# Patient Record
Sex: FEMALE | Race: WHITE | NOT HISPANIC OR LATINO | URBAN - METROPOLITAN AREA
[De-identification: names, ages, dates, MRNs, and addresses within clinical notes are randomized per-mention and may not be internally consistent; named-entity substitution may affect disease eponyms.]

---

## 2017-01-14 ENCOUNTER — OFFICE VISIT - HEALTHEAST (OUTPATIENT)
Dept: FAMILY MEDICINE | Facility: CLINIC | Age: 37
End: 2017-01-14

## 2017-01-14 DIAGNOSIS — N89.8 VAGINAL ITCHING: ICD-10-CM

## 2017-01-14 DIAGNOSIS — N76.0 VULVOVAGINITIS: ICD-10-CM

## 2017-02-07 ENCOUNTER — OFFICE VISIT - HEALTHEAST (OUTPATIENT)
Dept: INTERNAL MEDICINE | Facility: CLINIC | Age: 37
End: 2017-02-07

## 2017-02-07 DIAGNOSIS — J34.89 SINUS PRESSURE: ICD-10-CM

## 2017-02-07 ASSESSMENT — MIFFLIN-ST. JEOR: SCORE: 1240.6

## 2017-02-23 ENCOUNTER — COMMUNICATION - HEALTHEAST (OUTPATIENT)
Dept: INTERNAL MEDICINE | Facility: CLINIC | Age: 37
End: 2017-02-23

## 2017-04-23 ENCOUNTER — OFFICE VISIT - HEALTHEAST (OUTPATIENT)
Dept: FAMILY MEDICINE | Facility: CLINIC | Age: 37
End: 2017-04-23

## 2017-04-23 DIAGNOSIS — N89.8 VAGINAL DISCHARGE: ICD-10-CM

## 2017-04-23 ASSESSMENT — MIFFLIN-ST. JEOR: SCORE: 1237.88

## 2017-10-30 ENCOUNTER — COMMUNICATION - HEALTHEAST (OUTPATIENT)
Dept: INTERNAL MEDICINE | Facility: CLINIC | Age: 37
End: 2017-10-30

## 2018-01-26 ENCOUNTER — COMMUNICATION - HEALTHEAST (OUTPATIENT)
Dept: INTERNAL MEDICINE | Facility: CLINIC | Age: 38
End: 2018-01-26

## 2018-02-11 ENCOUNTER — OFFICE VISIT - HEALTHEAST (OUTPATIENT)
Dept: FAMILY MEDICINE | Facility: CLINIC | Age: 38
End: 2018-02-11

## 2018-02-11 DIAGNOSIS — N89.8 VAGINAL DISCHARGE: ICD-10-CM

## 2018-02-11 LAB
CLUE CELLS: NORMAL
TRICHOMONAS, WET PREP: NORMAL
YEAST, WET PREP: NORMAL

## 2018-04-23 ENCOUNTER — COMMUNICATION - HEALTHEAST (OUTPATIENT)
Dept: INTERNAL MEDICINE | Facility: CLINIC | Age: 38
End: 2018-04-23

## 2021-05-30 VITALS — WEIGHT: 125.6 LBS | BODY MASS INDEX: 20.93 KG/M2 | HEIGHT: 65 IN

## 2021-05-30 VITALS — BODY MASS INDEX: 20.51 KG/M2 | WEIGHT: 129 LBS

## 2021-05-30 VITALS — WEIGHT: 125 LBS | HEIGHT: 65 IN | BODY MASS INDEX: 20.83 KG/M2

## 2021-05-31 VITALS — WEIGHT: 125.3 LBS | BODY MASS INDEX: 20.85 KG/M2

## 2021-06-08 NOTE — PROGRESS NOTES
Name: Ester Haq  Age: 36 y.o.  Gender: female  : 1980  Date of Encounter: 2017      HPI:  Ester Haq is a 36 y.o.  female who presents to the clinic with concerns of possible vaginal yeast infection.  Patient reports increased discharge both watery and clumpy associated with vaginal itching.  Patient completed Diflucan treatment 10 days earlier on Thursday and then a second pill on .  Does not feel treatment changed her symptoms.  She is sexually active with the same partner for more than 2 years.  Last menstrual period was about 3 weeks ago.  She is on birth control.  Denies using lubricants with intercourse, douches, hygiene sprays, changes in soaps, detergents and fabric softeners.  Has been tested for STI's in the last 6-7 months which came back normal.  She is not concerned about getting retested.    Current Medication:   Medications reviewed and updated.    Current Outpatient Prescriptions:      norgestimate-ethinyl estradiol (TRI-SPRINTEC, 28,) 0.18/0.215/0.25 mg-35 mcg (28) Tab tablet, Take 1 tablet by mouth daily., Disp: 84 tablet, Rfl: 3     fluconazole (DIFLUCAN) 150 MG tablet, 1st tablet today and 2nd pill in 2 days, Disp: 2 tablet, Rfl: 0    Past Med / Surg History:  Past Medical History   Diagnosis Date     Abnormal Pap Smear Of Cervix      Created by Conversion      Acne      Created by Conversion      No past surgical history on file.    Fam / Soc History:  Family History   Problem Relation Age of Onset     Multiple sclerosis Mother      Social History     Social History     Marital status:      Spouse name: N/A     Number of children: N/A     Years of education: N/A     Occupational History     Not on file.     Social History Main Topics     Smoking status: Former Smoker     Quit date: 2013     Smokeless tobacco: Never Used     Alcohol use Yes      Comment: occasional     Drug use: Not on file     Sexual activity: Yes     Partners: Male     Birth control/  protection: OCP     Other Topics Concern     Not on file     Social History Narrative       ROS:  14 point review of systems unremarkable except as mentioned in HPI    Objective:  Vitals:   Visit Vitals     BP 90/60 (Patient Site: Right Arm, Patient Position: Sitting, Cuff Size: Adult Regular)     Pulse 62     Temp 98.6  F (37  C) (Oral)     Resp 18     Wt 129 lb (58.5 kg)     LMP 12/28/2016     SpO2 100%     Breastfeeding No     BMI 20.51 kg/m2       Gen: Alert, awake, well appearing, pleasant female in no acute distress  HEENT: NC, AT,   Eyes: Pupils equally round and reactive to light. Conjunctivae clear.  Sclera non-icteric.  Heart: Regular rate and rhythm; normal S1 and S2; no murmurs, gallops, or rubs.  Lungs: Unlabored respirations; symmetric chest expansion; clear breath sounds.  Abdomen:  Bowel sounds present.  Soft, non tender, non distended, no guarding or rebound, no hepatosplenomegaly,  No masses palpable.  Genitalia: Normal external female genitalia without lesions.  Vaginal and cervical mucosa is healthy-appearing.  Clear to whitish homogenous mucus in vaginal vault.  Swab obtained for wet prep.  No unusual odor.  No CMT.  Uterus is mobile nontender normal size and contour.  Adnexal exam is unremarkable.  Back:  Spine straight, no obvious deformities, no spinal or CVA tenderness.  Mental Status: Alert, oriented, in no distress. Mood and affect appropriate.    Pertinent results / imaging:  Results for orders placed or performed in visit on 01/14/17   Wet Prep, Vaginal   Result Value Ref Range    Yeast Result No yeast seen No yeast seen    Trichomonas No Trichomonas seen No Trichomonas seen    Clue Cells, Wet Prep No Clue cells seen No Clue cells seen       Assessment:  Vulvovaginitis    Plan: Reviewed normal test results with patient.  No treatment given at this time.  Advised wearing loose fitting clothes, cotton underclothes to absorb excess moisture, use of plain water for cleansing avoiding  astringent soaps, using sensitive detergents without dye and sents.  Reviewed normal physiologic changes occurring to her menstrual cycle.  With PCP if concerns or new symptoms develop.  Patient voiced understanding and was in agreement with plan.  Candie Stevens MD  1/14/2017

## 2021-06-08 NOTE — PROGRESS NOTES
[facility] Clinic Visit  Patient Name: Ester Haq  Patient Age: 36 y.o.  YOB: 1980  MRN: [id number]  ?  Date of Visit: 2/7/2017  Reason for Office Visit:   Chief Complaint   Patient presents with     Sinusitis     facial pressure-1 month/was on antibiotic     HPI: Ester Haq 36 y.o. female who presents to clinic for sinus pain/pressure. She had this about a month ago, saw a physician  online and was prescribed an antibiotic x 7 days. Feels like it never really went away. About 5 days ago she started to have facial pressure, some right ear pain and jaw pain. No discharge. Feels ok otherwise. No fevers. Has been taking Mucinex and saline nasal spray. No cough. No known allergies      Review of Systems: As noted in HPI     Current Scheduled Meds:  Outpatient Encounter Prescriptions as of 2/7/2017   Medication Sig Dispense Refill     norgestimate-ethinyl estradiol (TRI-SPRINTEC, 28,) 0.18/0.215/0.25 mg-35 mcg (28) Tab tablet Take 1 tablet by mouth daily. 84 tablet 3     [START ON 2/13/2017] azithromycin (ZITHROMAX Z-MELODY) 250 MG tablet Take 2 tablets (500 mg) on  Day 1,  followed by 1 tablet (250 mg) once daily on Days 2 through 5. 6 tablet 0     fluconazole (DIFLUCAN) 150 MG tablet 1st tablet today and 2nd pill in 2 days 2 tablet 0     [START ON 2/13/2017] fluconazole (DIFLUCAN) 150 MG tablet Take 1 tablet (150 mg total) by mouth once for 1 dose. 1 tablet 0     fluticasone (FLONASE) 50 mcg/actuation nasal spray 1 spray into each nostril daily. 16 g 2     [START ON 2/13/2017] predniSONE (DELTASONE) 20 MG tablet Take 1 tablet (20 mg total) by mouth daily for 5 doses. 5 tablet 0     No facility-administered encounter medications on file as of 2/7/2017.      Past Medical History:   Diagnosis Date     Abnormal Pap Smear Of Cervix     Created by Conversion      Acne     Created by Conversion      No past surgical history on file.  Social History   Substance Use Topics     Smoking status: Former Smoker  "    Quit date: 7/7/2013     Smokeless tobacco: Never Used     Alcohol use Yes      Comment: occasional       Objective / Physical Examination:  Visit Vitals     /80     Pulse 73     Temp 98.1  F (36.7  C) (Oral)     Ht 5' 5\" (1.651 m)     Wt 125 lb 9.6 oz (57 kg)     [facility] 12/28/2016     SpO2 97%     BMI 20.9 kg/m2     Wt Readings from Last 3 Encounters:   02/07/17 125 lb 9.6 oz (57 kg)   01/14/17 129 lb (58.5 kg)   12/29/16 127 lb 12.8 oz (58 kg)     Body mass index is 20.9 kg/(m^2). (>25?)    General Appearance: Alert and oriented in no acute distress  Head: mild right sided maxillary tenderness to percussion.   Ears: Tympanic membrane clear with landmarks well visualized bilaterally  Eyes: Conjunctivae clear   Nose: Septum midline, nares patent, mucosa moist and without drainage  Throat: Lips and mucosa moist. pharynx without erythema or exudate  Neck: Supple, trachea midline. Anterior 1+ cervical adenopathy.  Lungs: Clear to auscultation bilaterally. Normal inspiratory and expiratory effort. No w/r/r  Integumentary: Warm and dry    Assessment / Plan / Medical Decision Making:      Encounter Diagnoses   Name Primary?     Sinus pressure Yes        1. Sinus pressure    Recurrent v chronic sinusitis? She opted to try conservatively treating for next 5-7 days and if no improvement will try another course of antibiotics and oral prednisone.   Push fluids, flonase nasal spray, bryson pot, warm compresses   prescriptions next week if not improving   If this does not clear it up return to clinic and may need further eval, such as sinus CT    - fluticasone (FLONASE) 50 mcg/actuation nasal spray; 1 spray into each nostril daily.  Dispense: 16 g; Refill: 2  - azithromycin (ZITHROMAX Z-MELODY) 250 MG tablet; Take 2 tablets (500 mg) on  Day 1,  followed by 1 tablet (250 mg) once daily on Days 2 through 5.  Dispense: 6 tablet; Refill: 0  - predniSONE (DELTASONE) 20 MG tablet; Take 1 tablet (20 mg total) by mouth daily " for 5 doses.  Dispense: 5 tablet; Refill: 0      Total time spent with patient was 10 minutes with >50% of time spent in face-to-face counseling regarding the above plan     Yousif Osman MD  [facility]

## 2021-06-10 NOTE — PROGRESS NOTES
"Subjective:      Patient ID: Ester Haq is a 36 y.o. female.    Chief Complaint:    HPI  Ester Haq is a 36 y.o. female who presents today complaining of vaginal discharge.  She reports that this has been present for about a week.  She describes a white thick discharge and some irritation.  No fever.  No hematuria or frequency.  No concerns for STDs.      Past Medical History:   Diagnosis Date     Abnormal Pap Smear Of Cervix     Created by Conversion      Acne     Created by Conversion        History reviewed. No pertinent surgical history.    Family History   Problem Relation Age of Onset     Multiple sclerosis Mother        Social History   Substance Use Topics     Smoking status: Former Smoker     Quit date: 7/7/2013     Smokeless tobacco: Never Used     Alcohol use Yes      Comment: occasional       Review of Systems    Objective:     BP 98/58 (Patient Site: Right Arm, Patient Position: Sitting, Cuff Size: Adult Regular)  Pulse 68  Temp 98.5  F (36.9  C) (Oral)   Resp 16  Ht 5' 5\" (1.651 m)  Wt 125 lb (56.7 kg)  LMP 03/30/2017 (Approximate)  SpO2 99%  Breastfeeding? No  BMI 20.8 kg/m2    Physical Exam   Constitutional: She appears well-developed and well-nourished. No distress.   Genitourinary: No erythema in the vagina. No foreign body in the vagina. Vaginal discharge (thin, white discharge) found.   Genitourinary Comments: Mild inflammation of the labia but no other rash present.     Procedures    Results for orders placed or performed in visit on 04/23/17   Wet Prep, Vaginal   Result Value Ref Range    Yeast Result No yeast seen No yeast seen    Trichomonas No Trichomonas seen No Trichomonas seen    Clue Cells, Wet Prep No Clue cells seen No Clue cells seen        Assessment / Plan:     1. Vaginal discharge  Wet Prep, Vaginal         Patient Instructions   Wet prep negative.  Follow up as needed.        "

## 2021-06-16 NOTE — PROGRESS NOTES
Assessment:     1. Vaginal discharge  Wet Prep, Vaginal     Results for orders placed or performed in visit on 02/11/18   Wet Prep, Vaginal   Result Value Ref Range    Yeast Result No yeast seen No yeast seen    Trichomonas No Trichomonas seen No Trichomonas seen    Clue Cells, Wet Prep No Clue cells seen No Clue cells seen            Plan:     Discussed negative results with patient.  No treatment required.  Advised patient to perform good personal hygiene. Follow up with GYN if symptoms persists.     Subjective:       37 y.o. female presents for evaluation of vaginal discharge.  Patient reports that she noticed that discharge started a few days ago, slight order, excessive, watery and clumpy at the same time.  She is sexually active, one partner within the past year.  She denies pain with urination, urinary frequency or urgency.  Her last menstrual period was within the past 3-4 weeks, denies possibilities of being pregnant, and she has no concern for STD.  Patient will like to be evaluated for possible yeast infection or bacterial vaginosis.    The following portions of the patient's history were reviewed and updated as appropriate: allergies, current medications, past family history, past medical history, past social history, past surgical history and problem list.    Review of Systems  A 12 point comprehensive review of systems was negative except as noted.     Objective:      /60 (Patient Site: Right Arm, Patient Position: Sitting, Cuff Size: Adult Regular)  Pulse 91  Temp 98.4  F (36.9  C) (Oral)   Resp 18  Wt 125 lb 4.8 oz (56.8 kg)  LMP 01/14/2018  SpO2 100%  BMI 20.85 kg/m2  General appearance: alert, appears stated age, cooperative and no distress  Pelvic: external genitalia normal, positive findings: vaginal discharge:  white, thin, thick and malodorous, uterus normal size, shape, and consistency and copiuous   Skin: Skin color, texture, turgor normal. No rashes or lesions  Neurologic:  Grossly normal     This note has been dictated using voice recognition software. Any grammatical or context distortions are unintentional and inherent to the software